# Patient Record
Sex: FEMALE | Race: WHITE | NOT HISPANIC OR LATINO | Employment: OTHER | URBAN - METROPOLITAN AREA
[De-identification: names, ages, dates, MRNs, and addresses within clinical notes are randomized per-mention and may not be internally consistent; named-entity substitution may affect disease eponyms.]

---

## 2018-06-28 ENCOUNTER — TELEPHONE (OUTPATIENT)
Dept: GASTROENTEROLOGY | Facility: CLINIC | Age: 73
End: 2018-06-28

## 2018-06-28 DIAGNOSIS — K83.8 DILATED BILE DUCT: Primary | ICD-10-CM

## 2018-06-28 NOTE — TELEPHONE ENCOUNTER
Message   Received: Today   Message Contents   MD Paz Diaz MA   Caller: Unspecified (Today,  2:37 PM)             Ok for EUS.   Thanks   r      Please sign order

## 2018-06-29 ENCOUNTER — TELEPHONE (OUTPATIENT)
Dept: GASTROENTEROLOGY | Facility: CLINIC | Age: 73
End: 2018-06-29

## 2018-06-29 NOTE — TELEPHONE ENCOUNTER
Spoke with patient in regards to scheduling E[facility]. She stated that she did not want to schedule right now and would call back at a later time if she decides to schedule.

## 2018-07-02 ENCOUNTER — TELEPHONE (OUTPATIENT)
Dept: GASTROENTEROLOGY | Facility: CLINIC | Age: 73
End: 2018-07-02

## 2018-07-02 NOTE — TELEPHONE ENCOUNTER
Spoke with patient's daughter in regards to EUS. Patient refused to have EUS. I provided my number if she changes her mind.

## 2018-09-25 ENCOUNTER — TELEPHONE (OUTPATIENT)
Dept: ENDOSCOPY | Facility: HOSPITAL | Age: 73
End: 2018-09-25

## 2020-12-29 PROBLEM — I95.9 HYPOTENSION: Status: ACTIVE | Noted: 2020-12-29

## 2020-12-30 PROBLEM — E87.6 HYPOKALEMIA: Status: ACTIVE | Noted: 2020-12-30

## 2020-12-30 PROBLEM — A41.9 SEVERE SEPSIS: Status: ACTIVE | Noted: 2020-12-30

## 2020-12-30 PROBLEM — L03.114 LEFT ARM CELLULITIS: Status: ACTIVE | Noted: 2020-12-30

## 2020-12-30 PROBLEM — R65.20 SEVERE SEPSIS: Status: ACTIVE | Noted: 2020-12-30

## 2020-12-30 PROBLEM — I48.91 NEW ONSET A-FIB: Status: ACTIVE | Noted: 2020-12-30

## 2021-01-01 PROBLEM — G93.41 ENCEPHALOPATHY, METABOLIC: Status: ACTIVE | Noted: 2021-01-01

## 2021-01-04 PROBLEM — E87.20 METABOLIC ACIDOSIS: Status: ACTIVE | Noted: 2021-01-04

## 2021-01-04 PROBLEM — K63.1 BOWEL PERFORATION: Status: ACTIVE | Noted: 2021-01-04

## 2021-01-04 PROBLEM — R63.8 ALTERATION IN NUTRITION: Status: ACTIVE | Noted: 2021-01-04

## 2021-01-04 PROBLEM — N17.9 AKI (ACUTE KIDNEY INJURY): Status: ACTIVE | Noted: 2021-01-04

## 2021-01-05 PROBLEM — K25.5 PERFORATED GASTRIC ULCER: Status: ACTIVE | Noted: 2021-01-05
